# Patient Record
Sex: MALE | Race: BLACK OR AFRICAN AMERICAN | NOT HISPANIC OR LATINO | Employment: OTHER | URBAN - METROPOLITAN AREA
[De-identification: names, ages, dates, MRNs, and addresses within clinical notes are randomized per-mention and may not be internally consistent; named-entity substitution may affect disease eponyms.]

---

## 2019-08-13 PROBLEM — I10 ESSENTIAL (PRIMARY) HYPERTENSION: Status: ACTIVE | Noted: 2019-08-13

## 2019-08-19 ENCOUNTER — TELEPHONE (OUTPATIENT)
Dept: PHARMACY | Facility: CLINIC | Age: 59
End: 2019-08-19

## 2019-08-19 NOTE — TELEPHONE ENCOUNTER
No answer/VM to inform patient that [facility] received prescription for Mavyret and prior authorization is required.  [facility] will be back in touch once insurance determination is received.

## 2019-10-18 NOTE — TELEPHONE ENCOUNTER
DOCUMENTATION ONLY:  Appeal for Mavyret overturned from 9/24/2019 to 11/19/2019 x 8 weeks of treatment.   Case ID# [id number]    Co-pay: $0    Patient Assistance IS NOT required.     Forward to clinical pharmacist for consult & shipment.

## 2019-10-22 ENCOUNTER — TELEPHONE (OUTPATIENT)
Dept: PHARMACY | Facility: CLINIC | Age: 59
End: 2019-10-22

## 2019-10-22 NOTE — TELEPHONE ENCOUNTER
Initial Mavyret consult completed on 10/22. Mavyret will be shipped on 10/24 to arrive at patient's home on 10/25 via [employer]. Patient will start Mavyret on 10/26. Address confirmed. Confirmed 2 patient identifiers - name and . Therapy Appropriate.     Mavyret 100/40mg- Take three tablets by mouth once daily WITH FOOD x 8 weeks  Counseling was reviewed:   1. Patient MUST take Mavyret at the SAME time every day.   2. Potential Side effects include: nausea, headaches, diarrhea and fatigue.   Headache: Patient may treat with OTC remedies. If Tylenol is used, dose should not exceed 2000mg per day. Pt is taking Hydrocodone/APAP - Reminded the pt that this product contains 325mg of APAP. He reports that he takes no more than 3 per day. Reinforced that if he needs additional Tylenol for headache/pain that he should not exceed 3 regular strength Tylenol in addition to his Hydrocodone/APAP.   4. Medication list reviewed. No DDIs or allergies noted. Patient MUST contact myself or provider prior to starting any new OTC, herbal, or prescription drugs to avoid potential DDIs.    DDI: Medication list reviewed and potential DDIs addressed.    Discussed the importance of staying well hydrated while on therapy. Compliance stressed - patient to take missed doses as soon as remembered, but NOT to take 2 doses in one day. Patient will report questions or concerns to myself or practitioner. Patient verbalizes understanding. Patient plans to start Mavyret on 10/26.  Consultation included: indication; goals of treatment; administration; storage and handling; side effects; how to handle side effects; the importance of compliance; how to handle missed doses; the importance of laboratory monitoring; the importance of keeping all follow up appointments.  Patient understands to report any medication changes to [facility] and provider. All questions answered and addressed to patients satisfaction. F/U will occur 7-10 days from start of treatment, [facility]  to contact patient in 3 weeks for refills.

## 2019-10-30 PROBLEM — B44.9 ASPERGILLOMA: Status: ACTIVE | Noted: 2019-10-30

## 2019-11-18 ENCOUNTER — TELEPHONE (OUTPATIENT)
Dept: PHARMACY | Facility: CLINIC | Age: 59
End: 2019-11-18

## 2019-11-18 NOTE — TELEPHONE ENCOUNTER
Refill and follow up call for Mavyret (2 of 2) Patient confirmed they are in need of refill. Will ship out  to receive  with patient consent. Copay $0 in 004.    Initial clinical follow-up conducted for [place]. Name/ confirmed. no missed doses; no new medications. Patient did note that he felt like his heart was racing at times.Patient regularly checks his heart rate with his blood pressure and has not noticed any major shifts in his baseline heart rate. Advised patient to follow up with MD regarding the potential tachycardia and that it probably was not related to Mavyret therapy. Patient has an appt scheduled for Thursday and will talk to his MD then. Patient also noted that he was experiencing some constipation that has been giving him some issues probably related to [place] therapy. Advised patient to increase clear fluid intake and dietary fiber. Also recommended trying Lili-colace for more immediate relief and the patient can continue taking Lili-Colace prn for opioid induced constipation. If symptoms progress or are unresolved by pericolace to contact [facility] or [facility]. Advised on the effect of laxatives and timing of doses. Patient understands to report any medication changes to [facility] and provider. All questions answered and addressed to patients satisfaction.

## 2019-11-25 PROBLEM — M50.20 CERVICAL DISC HERNIATION: Status: ACTIVE | Noted: 2019-07-17

## 2019-11-25 PROBLEM — B18.2 HEP C W/O COMA, CHRONIC: Status: ACTIVE | Noted: 2019-11-25

## 2019-11-25 PROBLEM — F17.200 SMOKER: Status: ACTIVE | Noted: 2019-11-25

## 2019-11-25 PROBLEM — Z85.118 HISTORY OF LUNG CANCER: Status: ACTIVE | Noted: 2019-11-25

## 2019-11-25 PROBLEM — M54.16 CHRONIC RADICULAR PAIN OF LOWER BACK: Status: ACTIVE | Noted: 2017-10-18

## 2019-11-25 PROBLEM — G89.29 CHRONIC RADICULAR PAIN OF LOWER BACK: Status: ACTIVE | Noted: 2017-10-18

## 2019-11-25 PROBLEM — M54.2 NECK PAIN: Status: ACTIVE | Noted: 2019-10-31

## 2019-11-25 PROBLEM — M48.02 CERVICAL STENOSIS OF SPINAL CANAL: Status: ACTIVE | Noted: 2017-03-03

## 2020-03-31 PROBLEM — E55.9 VITAMIN D DEFICIENCY: Chronic | Status: ACTIVE | Noted: 2020-03-31

## 2020-03-31 PROBLEM — N18.30 ANEMIA OF CHRONIC RENAL FAILURE, STAGE 3 (MODERATE): Status: ACTIVE | Noted: 2020-03-31

## 2020-03-31 PROBLEM — D63.1 ANEMIA OF CHRONIC RENAL FAILURE, STAGE 3 (MODERATE): Status: ACTIVE | Noted: 2020-03-31

## 2020-04-03 PROBLEM — F11.20 OPIOID DEPENDENCE: Status: ACTIVE | Noted: 2020-04-03

## 2020-04-03 PROBLEM — M54.50 LOW BACK PAIN: Status: ACTIVE | Noted: 2020-04-03

## 2020-10-06 ENCOUNTER — PATIENT MESSAGE (OUTPATIENT)
Dept: RESEARCH | Facility: OTHER | Age: 60
End: 2020-10-06

## 2021-03-18 PROBLEM — R13.19 OTHER DYSPHAGIA: Status: ACTIVE | Noted: 2021-03-18

## 2021-04-19 PROBLEM — Z86.19 HISTORY OF HEPATITIS C: Status: ACTIVE | Noted: 2021-04-19

## 2021-04-19 PROBLEM — M10.9 GOUT: Status: ACTIVE | Noted: 2021-04-19

## 2021-04-19 PROBLEM — R13.12 OROPHARYNGEAL DYSPHAGIA: Status: ACTIVE | Noted: 2021-04-19

## 2021-04-19 PROBLEM — E46 MALNUTRITION: Status: ACTIVE | Noted: 2021-04-19

## 2021-05-25 PROBLEM — F11.20 OPIOID DEPENDENCE: Status: RESOLVED | Noted: 2020-04-03 | Resolved: 2021-05-25

## 2021-06-18 PROBLEM — Z96.89 S/P INSERTION OF SPINAL CORD STIMULATOR: Status: ACTIVE | Noted: 2021-06-18

## 2021-07-06 PROBLEM — R63.0 ANOREXIA: Status: ACTIVE | Noted: 2021-07-06

## 2021-07-08 PROBLEM — N17.9 AKI (ACUTE KIDNEY INJURY): Status: ACTIVE | Noted: 2021-07-08

## 2021-07-08 PROBLEM — R53.1 GENERALIZED WEAKNESS: Status: ACTIVE | Noted: 2021-07-08

## 2021-07-08 PROBLEM — E43 SEVERE MALNUTRITION: Status: ACTIVE | Noted: 2021-07-08

## 2021-08-15 PROBLEM — R33.9 URINARY RETENTION: Status: ACTIVE | Noted: 2021-08-15

## 2021-08-15 PROBLEM — Z93.1 PEG (PERCUTANEOUS ENDOSCOPIC GASTROSTOMY) STATUS: Status: ACTIVE | Noted: 2021-08-15

## 2021-08-15 PROBLEM — K62.89 RECTAL PAIN: Status: ACTIVE | Noted: 2021-08-15

## 2021-08-15 PROBLEM — L98.8 SKIN MACERATION: Status: ACTIVE | Noted: 2021-08-15

## 2021-08-15 PROBLEM — K59.09 CHRONIC CONSTIPATION: Status: ACTIVE | Noted: 2021-08-15

## 2021-08-16 PROBLEM — K62.89 RECTAL PAIN: Status: RESOLVED | Noted: 2021-08-15 | Resolved: 2021-08-16

## 2021-08-16 PROBLEM — E87.1 HYPONATREMIA: Status: ACTIVE | Noted: 2021-08-16

## 2021-08-16 PROBLEM — R16.0 LIVER MASS: Status: ACTIVE | Noted: 2021-08-16

## 2021-08-16 PROBLEM — R10.9 ABDOMINAL PAIN: Status: ACTIVE | Noted: 2021-08-15

## 2021-08-18 PROBLEM — N18.4 CKD (CHRONIC KIDNEY DISEASE), STAGE IV: Status: ACTIVE | Noted: 2021-08-18

## 2021-08-19 PROBLEM — E87.1 HYPONATREMIA: Status: RESOLVED | Noted: 2021-08-16 | Resolved: 2021-08-19

## 2021-08-19 PROBLEM — R10.9 ABDOMINAL PAIN: Status: RESOLVED | Noted: 2021-08-15 | Resolved: 2021-08-19

## 2021-08-19 PROBLEM — R33.9 URINARY RETENTION: Status: RESOLVED | Noted: 2021-08-15 | Resolved: 2021-08-19

## 2022-07-03 PROBLEM — N25.81 SECONDARY HYPERPARATHYROIDISM OF RENAL ORIGIN: Status: ACTIVE | Noted: 2022-07-03

## 2022-07-03 PROBLEM — E87.20 METABOLIC ACIDOSIS: Status: ACTIVE | Noted: 2022-07-03

## 2022-07-03 PROBLEM — E83.52 HYPERCALCEMIA: Status: ACTIVE | Noted: 2022-07-03

## 2022-08-08 PROBLEM — R10.84 GENERALIZED ABDOMINAL PAIN: Status: ACTIVE | Noted: 2021-08-15

## 2022-08-09 PROBLEM — E87.5 HYPERKALEMIA: Status: ACTIVE | Noted: 2022-08-09

## 2022-11-14 PROBLEM — G89.3 CANCER RELATED PAIN: Status: ACTIVE | Noted: 2022-11-14

## 2022-11-14 PROBLEM — R00.2 PALPITATIONS: Status: ACTIVE | Noted: 2022-11-14

## 2023-01-26 DIAGNOSIS — U07.1 COVID-19 VIRUS DETECTED: ICD-10-CM

## 2023-01-27 ENCOUNTER — NURSE TRIAGE (OUTPATIENT)
Dept: ADMINISTRATIVE | Facility: CLINIC | Age: 63
End: 2023-01-27

## 2023-01-27 NOTE — TELEPHONE ENCOUNTER
Pt states tested positive for Covid yesterday, was told a rx would be called into pharmacy. Pt states no rx at this time, and he called earlier this morning and has not heard back from office. Per protocol, discuss with PCP and callback by nurse within 1 hour.     Reason for Disposition   Prescription not at pharmacy and was prescribed by PCP recently  (Exception: triager has access to EMR and prescription is recorded there. Go to [facility] and confirm for pharmacy.)    Protocols used: Medication Refill and Renewal Call-A-OH

## 2023-01-27 NOTE — TELEPHONE ENCOUNTER
Spoke with patient states he spoke with two other nurses earlier.  Patient was triaged again as he states his symptoms are worsening.  Current symptoms are mild difficulty breathing, chest pressure, elevated heart rate, cough, and weakness.  Current B/P 97-53, HR-114.  Patient denies having severe difficulty breathing.  Advised patient to go to ER for evaluation.  Patient verbalized understanding.    Patient states he does not have transportation.  Informed patient that he could call [id number] for transport.    Reason for Disposition   Chest pain or pressure  (Exception: MILD central chest pain, present only when coughing.)   Dizziness, lightheadedness, or weakness    Additional Information   Negative: SEVERE difficulty breathing (e.g., struggling for each breath, speaks in single words)   Negative: Difficult to awaken or acting confused (e.g., disoriented, slurred speech)   Negative: Bluish (or gray) lips or face now   Negative: Shock suspected (e.g., cold/pale/clammy skin, too weak to stand, low BP, rapid pulse)   Negative: Sounds like a life-threatening emergency to the triager   Negative: SEVERE or constant chest pain or pressure  (Exception: Mild central chest pain, present only when coughing.)   Negative: MODERATE difficulty breathing (e.g., speaks in phrases, SOB even at rest, pulse 100-120)   Negative: Headache and stiff neck (can't touch chin to chest)   Negative: Oxygen level (e.g., pulse oximetry) 90 percent or lower   Negative: Passed out (i.e., lost consciousness, collapsed and was not responding)   Negative: Shock suspected (e.g., cold/pale/clammy skin, too weak to stand, low BP, rapid pulse)   Negative: Difficult to awaken or acting confused (e.g., disoriented, slurred speech)   Negative: Visible sweat on face or sweat dripping down face   Negative: Unable to walk, or can only walk with assistance (e.g., requires support)   Negative: Received SHOCK from implantable cardiac defibrillator and has  persisting symptoms (i.e., palpitations, lightheadedness)   Negative: Dizziness, lightheadedness, or weakness and heart beating very rapidly (e.g., > 140 / minute)   Negative: Dizziness, lightheadedness, or weakness and heart beating very slowly (e.g., < 50 / minute)   Negative: Sounds like a life-threatening emergency to the triager   Negative: Difficulty breathing    Protocols used: Coronavirus (COVID-19) Diagnosed or [id number], Heart Rate and Heartbeat [id number]

## 2023-01-27 NOTE — TELEPHONE ENCOUNTER
Pt called for c/o medication not at the Pharmacy and was seen yesterday by the oncologist and he said they were suppose to send in medication for covid. Pt triaged for covid and care advice was to discuss with PCP and get a call back from a nurse within an hour. Pt told to reach back out if SOB gets worse. Pt has CP but only when coughing. He said that he has a lot of congestion as well. Pt told that I will reach out to MD and if doesn't hear anything within the hour to call us back. Please reach out to pt ASAP              Reason for Disposition   MILD difficulty breathing (e.g., minimal/no SOB at rest, SOB with walking, pulse <100)    Additional Information   Negative: SEVERE difficulty breathing (e.g., struggling for each breath, speaks in single words)   Negative: Difficult to awaken or acting confused (e.g., disoriented, slurred speech)   Negative: Bluish (or gray) lips or face now   Negative: Shock suspected (e.g., cold/pale/clammy skin, too weak to stand, low BP, rapid pulse)   Negative: Sounds like a life-threatening emergency to the triager   Negative: SEVERE or constant chest pain or pressure  (Exception: Mild central chest pain, present only when coughing.)   Negative: MODERATE difficulty breathing (e.g., speaks in phrases, SOB even at rest, pulse 100-120)   Negative: Headache and stiff neck (can't touch chin to chest)   Negative: Oxygen level (e.g., pulse oximetry) 90 percent or lower   Negative: Chest pain or pressure  (Exception: MILD central chest pain, present only when coughing.)   Negative: Patient sounds very sick or weak to the triager    Protocols used: Coronavirus (COVID-19) Diagnosed or [id number]

## 2023-01-30 ENCOUNTER — NURSE TRIAGE (OUTPATIENT)
Dept: ADMINISTRATIVE | Facility: CLINIC | Age: 63
End: 2023-01-30

## 2023-01-30 NOTE — TELEPHONE ENCOUNTER
Spoke with pt who states he tested positive for COVID. States that he has productive cough. Asking if ok to take robitussin with augmentin.  Pt advised should have call by office within 1 hour.pt verbalized understanding    Reason for Disposition   [1] HIGH RISK for severe COVID complications (e.g., weak immune system, age > 64 years, obesity with BMI of 30 or higher, pregnant, chronic lung disease or other chronic medical condition) AND [2] COVID symptoms (e.g., cough, fever)  (Exceptions: Already seen by PCP and no new or worsening symptoms.)    Additional Information   Negative: SEVERE difficulty breathing (e.g., struggling for each breath, speaks in single words)   Negative: Difficult to awaken or acting confused (e.g., disoriented, slurred speech)   Negative: Bluish (or gray) lips or face now   Negative: Shock suspected (e.g., cold/pale/clammy skin, too weak to stand, low BP, rapid pulse)   Negative: Sounds like a life-threatening emergency to the triager   Negative: SEVERE or constant chest pain or pressure  (Exception: Mild central chest pain, present only when coughing.)   Negative: MODERATE difficulty breathing (e.g., speaks in phrases, SOB even at rest, pulse 100-120)   Negative: Headache and stiff neck (can't touch chin to chest)   Negative: Oxygen level (e.g., pulse oximetry) 90 percent or lower   Negative: Chest pain or pressure  (Exception: MILD central chest pain, present only when coughing.)   Negative: Patient sounds very sick or weak to the triager   Negative: MILD difficulty breathing (e.g., minimal/no SOB at rest, SOB with walking, pulse <100)   Negative: Fever > 103 F (39.4 C)   Negative: [1] Fever > 101 F (38.3 C) AND [2] over 60 years of age   Negative: [1] Fever > 100.0 F (37.8 C) AND [2] bedridden (e.g., CVA, chronic illness, recovering from surgery)    Protocols used: Coronavirus (COVID-19) Diagnosed or [id number]

## 2023-02-04 ENCOUNTER — NURSE TRIAGE (OUTPATIENT)
Dept: ADMINISTRATIVE | Facility: CLINIC | Age: 63
End: 2023-02-04

## 2023-02-04 NOTE — TELEPHONE ENCOUNTER
[facility] pt.   CV+ 1/26 in oncology clinic.     Pt states symptoms have resolved, finished abx & feeling much better. Wanted to call to note chart. No further assistance needed.     Reason for Disposition   General information question, no triage required and triager able to answer question    Protocols used: Information Only Call - No Triage-A-

## 2023-02-24 PROBLEM — D84.9 IMMUNOCOMPROMISED STATE: Status: ACTIVE | Noted: 2023-02-24

## 2023-02-24 PROBLEM — I26.99 PULMONARY INFARCTION: Status: ACTIVE | Noted: 2023-02-24

## 2023-02-24 PROBLEM — J18.9 PNEUMONIA OF RIGHT UPPER LOBE DUE TO INFECTIOUS ORGANISM: Status: ACTIVE | Noted: 2023-02-24

## 2023-03-01 PROBLEM — M10.9 GOUT: Status: RESOLVED | Noted: 2021-04-19 | Resolved: 2023-03-01

## 2023-03-04 PROBLEM — N18.9 ACUTE-ON-CHRONIC KIDNEY INJURY: Status: ACTIVE | Noted: 2021-07-08

## 2023-03-06 PROBLEM — Z71.6 TOBACCO ABUSE COUNSELING: Status: ACTIVE | Noted: 2023-03-06

## 2023-04-20 PROBLEM — R11.0 NAUSEA: Status: ACTIVE | Noted: 2023-04-20

## 2023-04-20 PROBLEM — J18.9 PNEUMONIA: Status: ACTIVE | Noted: 2023-04-20

## 2023-04-20 PROBLEM — R11.10 VOMITING: Status: ACTIVE | Noted: 2023-04-20

## 2023-04-20 PROBLEM — R07.82 INTERCOSTAL PAIN: Status: ACTIVE | Noted: 2023-04-20

## 2023-04-20 PROBLEM — Z09 S/P GASTROSTOMY TUBE (G TUBE) PLACEMENT, FOLLOW-UP EXAM: Status: ACTIVE | Noted: 2023-04-20

## 2023-04-20 PROBLEM — K59.00 CONSTIPATION: Status: ACTIVE | Noted: 2021-08-15

## 2023-04-22 PROBLEM — R00.0 TACHYCARDIA: Status: ACTIVE | Noted: 2023-04-22

## 2023-04-24 PROBLEM — K62.5 BRBPR (BRIGHT RED BLOOD PER RECTUM): Status: ACTIVE | Noted: 2023-04-24

## 2023-06-05 PROBLEM — N17.9 ACUTE-ON-CHRONIC KIDNEY INJURY: Status: RESOLVED | Noted: 2021-07-08 | Resolved: 2023-06-05

## 2023-06-05 PROBLEM — N18.9 ACUTE-ON-CHRONIC KIDNEY INJURY: Status: RESOLVED | Noted: 2021-07-08 | Resolved: 2023-06-05

## 2023-07-15 PROBLEM — N18.6 ESRD (END STAGE RENAL DISEASE): Status: ACTIVE | Noted: 2023-07-15

## 2023-07-24 PROBLEM — J18.9 PNEUMONIA OF LEFT LOWER LOBE DUE TO INFECTIOUS ORGANISM: Status: RESOLVED | Noted: 2023-02-24 | Resolved: 2023-07-24

## 2023-07-24 PROBLEM — J18.9 COMMUNITY ACQUIRED PNEUMONIA: Status: RESOLVED | Noted: 2023-04-20 | Resolved: 2023-07-24

## 2023-07-24 PROBLEM — Z09 S/P GASTROSTOMY TUBE (G TUBE) PLACEMENT, FOLLOW-UP EXAM: Status: RESOLVED | Noted: 2023-04-20 | Resolved: 2023-07-24

## 2023-07-24 PROBLEM — K62.5 BRBPR (BRIGHT RED BLOOD PER RECTUM): Status: RESOLVED | Noted: 2023-04-24 | Resolved: 2023-07-24

## 2023-07-24 PROBLEM — B44.9 ASPERGILLOSIS, WITH PNEUMONIA: Status: RESOLVED | Noted: 2019-10-30 | Resolved: 2023-07-24

## 2023-08-23 ENCOUNTER — PATIENT OUTREACH (OUTPATIENT)
Dept: ADMINISTRATIVE | Facility: HOSPITAL | Age: 63
End: 2023-08-23

## 2023-09-21 PROBLEM — B44.1 PULMONARY ASPERGILLOSIS: Status: ACTIVE | Noted: 2019-10-30

## 2023-09-21 PROBLEM — F17.200 TOBACCO DEPENDENCY: Status: ACTIVE | Noted: 2023-09-21

## 2023-09-22 PROBLEM — Z91.148 POOR COMPLIANCE WITH MEDICATION: Status: ACTIVE | Noted: 2023-09-22

## 2023-09-22 PROBLEM — J96.11 CHRONIC RESPIRATORY FAILURE WITH HYPOXIA: Status: ACTIVE | Noted: 2023-09-22

## 2023-09-22 PROBLEM — J43.9 LUNG DISEASE, BULLOUS: Status: ACTIVE | Noted: 2023-09-22

## 2023-09-22 PROBLEM — R53.81 PHYSICAL DECONDITIONING: Status: ACTIVE | Noted: 2023-09-22

## 2023-09-22 PROBLEM — Z71.89 COUNSELING REGARDING ADVANCE DIRECTIVES AND GOALS OF CARE: Status: ACTIVE | Noted: 2023-09-22

## 2023-09-23 PROBLEM — N25.81 SECONDARY HYPERPARATHYROIDISM: Status: ACTIVE | Noted: 2023-09-23

## 2023-09-24 PROBLEM — J15.1 PSEUDOMONAS PNEUMONIA: Status: ACTIVE | Noted: 2023-09-24

## 2023-09-28 PROBLEM — Z93.1 FEEDING BY G-TUBE: Status: ACTIVE | Noted: 2023-09-28

## 2023-09-30 PROBLEM — D63.1 ANEMIA IN ESRD (END-STAGE RENAL DISEASE): Status: ACTIVE | Noted: 2023-09-30

## 2023-09-30 PROBLEM — K59.04 CHRONIC IDIOPATHIC CONSTIPATION: Status: ACTIVE | Noted: 2021-08-15

## 2023-09-30 PROBLEM — N18.6 ESRD (END STAGE RENAL DISEASE) ON DIALYSIS: Status: ACTIVE | Noted: 2023-09-30

## 2023-09-30 PROBLEM — Z99.2 ESRD (END STAGE RENAL DISEASE) ON DIALYSIS: Status: ACTIVE | Noted: 2023-09-30

## 2023-09-30 PROBLEM — N18.6 ANEMIA IN ESRD (END-STAGE RENAL DISEASE): Status: ACTIVE | Noted: 2023-09-30

## 2023-10-03 PROBLEM — Z91.89 AT RISK FOR PROLONGED QT INTERVAL SYNDROME: Status: ACTIVE | Noted: 2023-10-03

## 2023-11-29 PROBLEM — J96.11 CHRONIC RESPIRATORY FAILURE WITH HYPOXIA: Status: RESOLVED | Noted: 2023-09-22 | Resolved: 2023-11-29

## 2023-12-25 PROBLEM — J15.1 PSEUDOMONAS PNEUMONIA: Status: RESOLVED | Noted: 2023-09-24 | Resolved: 2023-12-25

## 2023-12-25 PROBLEM — J18.9 PNEUMONIA OF RIGHT UPPER LOBE DUE TO INFECTIOUS ORGANISM: Status: RESOLVED | Noted: 2023-02-24 | Resolved: 2023-12-25

## 2024-03-13 PROBLEM — N18.4 CKD (CHRONIC KIDNEY DISEASE), STAGE IV: Status: RESOLVED | Noted: 2021-08-18 | Resolved: 2024-03-13

## 2024-03-13 PROBLEM — N18.6 ESRD (END STAGE RENAL DISEASE): Status: RESOLVED | Noted: 2023-07-15 | Resolved: 2024-03-13

## 2024-04-08 PROBLEM — Z99.2 ANEMIA IN CHRONIC KIDNEY DISEASE, ON CHRONIC DIALYSIS: Status: ACTIVE | Noted: 2023-09-30

## 2024-04-09 PROBLEM — I25.10 CORONARY ARTERY DISEASE: Status: ACTIVE | Noted: 2024-04-09

## 2024-04-09 PROBLEM — I21.4 NSTEMI (NON-ST ELEVATED MYOCARDIAL INFARCTION): Status: ACTIVE | Noted: 2024-04-09

## 2024-05-03 PROBLEM — I16.1 HYPERTENSIVE EMERGENCY: Status: ACTIVE | Noted: 2024-05-03

## 2024-05-03 PROBLEM — J96.22 ACUTE ON CHRONIC RESPIRATORY FAILURE WITH HYPOXIA AND HYPERCAPNIA: Status: ACTIVE | Noted: 2024-05-03

## 2024-05-03 PROBLEM — J96.21 ACUTE ON CHRONIC RESPIRATORY FAILURE WITH HYPOXIA AND HYPERCAPNIA: Status: ACTIVE | Noted: 2024-05-03

## 2024-05-04 PROBLEM — J43.1 PANLOBULAR EMPHYSEMA: Status: ACTIVE | Noted: 2024-05-04

## 2024-05-06 PROBLEM — I10 ESSENTIAL HYPERTENSION: Status: ACTIVE | Noted: 2024-05-06

## 2024-05-06 PROBLEM — R06.02 SOB (SHORTNESS OF BREATH): Status: ACTIVE | Noted: 2024-05-06

## 2024-05-06 PROBLEM — E83.39 HYPERPHOSPHATEMIA: Status: ACTIVE | Noted: 2024-05-06

## 2024-05-30 ENCOUNTER — CLINICAL SUPPORT (OUTPATIENT)
Dept: SMOKING CESSATION | Facility: CLINIC | Age: 64
End: 2024-05-30
Payer: COMMERCIAL

## 2024-05-30 DIAGNOSIS — F17.200 NICOTINE DEPENDENCE: Primary | ICD-10-CM

## 2024-05-30 PROCEDURE — 99404 PREV MED CNSL INDIV APPRX 60: CPT | Mod: S$GLB,,, | Performed by: GENERAL PRACTICE

## 2024-05-30 RX ORDER — MICONAZOLE NITRATE 2 %
2 CREAM (GRAM) TOPICAL
Qty: 100 EACH | Refills: 0 | Status: SHIPPED | OUTPATIENT
Start: 2024-05-30

## 2024-05-30 RX ORDER — IBUPROFEN 200 MG
1 TABLET ORAL DAILY
Qty: 14 PATCH | Refills: 0 | Status: SHIPPED | OUTPATIENT
Start: 2024-05-30

## 2024-06-12 ENCOUNTER — CLINICAL SUPPORT (OUTPATIENT)
Dept: SMOKING CESSATION | Facility: CLINIC | Age: 64
End: 2024-06-12
Payer: COMMERCIAL

## 2024-06-12 DIAGNOSIS — F17.200 NICOTINE DEPENDENCE: Primary | ICD-10-CM

## 2024-06-12 PROCEDURE — 99402 PREV MED CNSL INDIV APPRX 30: CPT | Mod: S$GLB,,, | Performed by: GENERAL PRACTICE

## 2024-06-12 NOTE — PROGRESS NOTES
Individual Follow-Up Form    6/12/2024    Quit Date: 5/30/24    Clinical Status of Patient: Outpatient    Length of Service: 30 minutes    Continuing Medication: yes  Nicotine gum    Other Medications: none     Target Symptoms: Withdrawal and medication side effects. The following were  rated moderate (3) to severe (4) on TCRS:  Moderate (3): none  Severe (4): none    Comments: Followed up with patient in clinic. Patient has quit smoking. Orientation, client introductions, completion of TCRS (Tobacco Cessation Rating Scale) learned addiction model, cues/triggers, personal reasons for quitting, medications, goals, quit date. The patient will continue with  therapy sessions and medication monitoring by CTTS. Prescribed medication management will be by physician. The patient denies any abnormal behavioral or mental changes at this time.      Diagnosis: F17.210    Next Visit: 2 weeks

## 2024-06-27 ENCOUNTER — CLINICAL SUPPORT (OUTPATIENT)
Dept: SMOKING CESSATION | Facility: CLINIC | Age: 64
End: 2024-06-27
Attending: SURGERY
Payer: COMMERCIAL

## 2024-06-27 DIAGNOSIS — F17.200 NICOTINE DEPENDENCE: Primary | ICD-10-CM

## 2024-06-27 RX ORDER — IBUPROFEN 200 MG
1 TABLET ORAL DAILY
Qty: 14 PATCH | Refills: 0 | Status: SHIPPED | OUTPATIENT
Start: 2024-06-27

## 2024-06-27 RX ORDER — MICONAZOLE NITRATE 2 %
2 CREAM (GRAM) TOPICAL
Qty: 100 EACH | Refills: 0 | Status: SHIPPED | OUTPATIENT
Start: 2024-06-27

## 2024-06-27 NOTE — PROGRESS NOTES
Individual Follow-Up Form    6/27/2024    Quit Date: TBD    Clinical Status of Patient: Outpatient    Length of Service: 30 minutes    Continuing Medication: yes  Patches    Other Medications: gum     Target Symptoms: Withdrawal and medication side effects. The following were  rated moderate (3) to severe (4) on TCRS:  Moderate (3): none  Severe (4): none    Comments: Followed up with patient in clinic. Patient did start back smoking due to missing the taste of a cigarette. Completion of TCRS (Tobacco Cessation Rating Scale) reviewed strategies, cues, and triggers. Introduced the negative impact of tobacco on health, the health advantages of discontinuing the use of tobacco, time line improved health changes after a quit, withdrawal issues to expect from nicotine and habit, and ways to achieve the goal of a quit. The patient will continue with  therapy sessions and medication monitoring by CTTS. Prescribed medication management will be by physician. The patient denies any abnormal behavioral or mental changes at this time.      Diagnosis: F17.210    Next Visit: 2 weeks

## 2024-07-10 ENCOUNTER — CLINICAL SUPPORT (OUTPATIENT)
Dept: SMOKING CESSATION | Facility: CLINIC | Age: 64
End: 2024-07-10
Payer: COMMERCIAL

## 2024-07-10 DIAGNOSIS — F17.200 NICOTINE DEPENDENCE: Primary | ICD-10-CM

## 2024-07-10 NOTE — PROGRESS NOTES
Individual Follow-Up Form    7/10/2024    Quit Date: TBD    Clinical Status of Patient: Outpatient    Length of Service: 30 minutes    Continuing Medication: yes  Patches    Other Medications: lozenges     Target Symptoms: Withdrawal and medication side effects. The following were  rated moderate (3) to severe (4) on TCRS:  Moderate (3): none  Severe (4): none    Comments: Followed up with patient in clinic. Patient did go up to 4 cigarettes per day due to his cousin passing away.  Encouraged patient to start thinking of a quit date. The patient will continue with  therapy sessions and medication monitoring by CTTS. Prescribed medication management will be by physician. The patient denies any abnormal behavioral or mental changes at this time.      Diagnosis: F17.210    Next Visit: 2 weeks

## 2024-07-15 PROBLEM — I21.4 NSTEMI (NON-ST ELEVATED MYOCARDIAL INFARCTION): Status: RESOLVED | Noted: 2024-04-09 | Resolved: 2024-07-15

## 2024-07-25 ENCOUNTER — CLINICAL SUPPORT (OUTPATIENT)
Dept: SMOKING CESSATION | Facility: CLINIC | Age: 64
End: 2024-07-25
Payer: COMMERCIAL

## 2024-07-25 DIAGNOSIS — F17.200 NICOTINE DEPENDENCE: Primary | ICD-10-CM

## 2024-07-25 PROCEDURE — 99402 PREV MED CNSL INDIV APPRX 30: CPT | Mod: S$GLB,,, | Performed by: GENERAL PRACTICE

## 2024-07-29 NOTE — PROGRESS NOTES
Individual Follow-Up Form    7/29/2024    Quit Date: TBD    Clinical Status of Patient: Outpatient    Length of Service: 30 minutes    Continuing Medication: yes  Patches    Other Medications: lozenges     Target Symptoms: Withdrawal and medication side effects. The following were  rated moderate (3) to severe (4) on TCRS:  Moderate (3): none  Severe (4): none    Comments: Followed up with patient. He is smoking 2 cigarettes per day. Completion of TCRS (Tobacco Cessation Rating Scale) reviewed strategies, habitual behavior, stress, and high risk situations. Introduced stress with addition interventions, SOLVE, relaxation with interventions, nutrition, exercise, weight gain, and the importance of rewarding oneself for accomplishments toward becoming tobacco free. Open discussion of all items with interventions.  The patient will continue with  therapy sessions and medication monitoring by CTTS. Prescribed medication management will be by physician. The patient denies any abnormal behavioral or mental changes at this time.      Diagnosis: F17.210    Next Visit: 2 weeks

## 2024-08-05 PROBLEM — J96.21 ACUTE ON CHRONIC RESPIRATORY FAILURE WITH HYPOXIA AND HYPERCAPNIA: Status: RESOLVED | Noted: 2024-05-03 | Resolved: 2024-08-05

## 2024-08-05 PROBLEM — J96.22 ACUTE ON CHRONIC RESPIRATORY FAILURE WITH HYPOXIA AND HYPERCAPNIA: Status: RESOLVED | Noted: 2024-05-03 | Resolved: 2024-08-05

## 2024-08-07 ENCOUNTER — CLINICAL SUPPORT (OUTPATIENT)
Dept: SMOKING CESSATION | Facility: CLINIC | Age: 64
End: 2024-08-07
Payer: COMMERCIAL

## 2024-08-07 DIAGNOSIS — F17.200 NICOTINE DEPENDENCE: Primary | ICD-10-CM

## 2024-08-07 PROBLEM — Z93.1 PEG (PERCUTANEOUS ENDOSCOPIC GASTROSTOMY) STATUS: Status: RESOLVED | Noted: 2021-08-15 | Resolved: 2024-08-07

## 2024-08-07 PROBLEM — Z74.2 NEED FOR HOME HEALTH CARE: Status: ACTIVE | Noted: 2024-08-07

## 2024-08-22 ENCOUNTER — CLINICAL SUPPORT (OUTPATIENT)
Dept: SMOKING CESSATION | Facility: CLINIC | Age: 64
End: 2024-08-22
Payer: COMMERCIAL

## 2024-08-22 DIAGNOSIS — F17.200 NICOTINE DEPENDENCE: Primary | ICD-10-CM

## 2024-08-22 PROCEDURE — 99402 PREV MED CNSL INDIV APPRX 30: CPT | Mod: S$GLB,,, | Performed by: GENERAL PRACTICE

## 2024-08-26 NOTE — PROGRESS NOTES
Individual Follow-Up Form    8/26/2024    Quit Date: TBD    Clinical Status of Patient: Outpatient    Length of Service: 30 minutes    Continuing Medication: yes  Patches    Other Medications: none     Target Symptoms: Withdrawal and medication side effects. The following were  rated moderate (3) to severe (4) on TCRS:  Moderate (3): none  Severe (4): none    Comments: Followed up with patient. He is down to 1 cigarette per day. completion of TCRS (Tobacco Cessation Rating Scale) reviewed strategies, cues, triggers, high risk situations, lapses, relapses, diet, exercise, stress, relaxation, sleep, habitual behavior, and life style changes. The patient will continue with  therapy sessions and medication monitoring by CTTS. Prescribed medication management will be by physician. The patient denies any abnormal behavioral or mental changes at this time.      Diagnosis: F17.210    Next Visit: 2 weeks

## 2024-09-02 PROBLEM — J44.1 COPD WITH ACUTE EXACERBATION: Status: ACTIVE | Noted: 2024-09-02

## 2024-09-04 PROBLEM — E55.9 VITAMIN D DEFICIENCY, UNSPECIFIED: Status: ACTIVE | Noted: 2024-09-04

## 2024-09-06 PROBLEM — J96.21 ACUTE ON CHRONIC HYPOXIC RESPIRATORY FAILURE: Status: RESOLVED | Noted: 2024-05-03 | Resolved: 2024-09-06

## 2024-09-06 PROBLEM — J44.1 COPD WITH ACUTE EXACERBATION: Status: RESOLVED | Noted: 2024-09-02 | Resolved: 2024-09-06

## 2024-09-09 ENCOUNTER — TELEPHONE (OUTPATIENT)
Dept: SMOKING CESSATION | Facility: CLINIC | Age: 64
End: 2024-09-09
Payer: MEDICAID

## 2024-11-22 PROBLEM — J96.01 ACUTE HYPOXEMIC RESPIRATORY FAILURE: Status: ACTIVE | Noted: 2024-11-22

## 2024-11-23 PROBLEM — E87.5 HYPERKALEMIA: Status: RESOLVED | Noted: 2022-08-09 | Resolved: 2024-11-23

## 2024-11-26 ENCOUNTER — TELEPHONE (OUTPATIENT)
Dept: SMOKING CESSATION | Facility: CLINIC | Age: 64
End: 2024-11-26
Payer: MEDICAID

## 2024-11-26 NOTE — TELEPHONE ENCOUNTER
1st attempt regarding smoking cessation 6 month follow up for quit 1 episode. Voicemail box is full. Unable to leave a message.

## 2025-01-02 PROBLEM — E83.59 OTHER DISORDERS OF CALCIUM METABOLISM: Status: ACTIVE | Noted: 2023-08-09

## 2025-01-02 PROBLEM — R53.81 OTHER MALAISE: Status: ACTIVE | Noted: 2023-10-03

## 2025-01-02 PROBLEM — N18.4 CHRONIC KIDNEY DISEASE, STAGE 4 (SEVERE): Status: ACTIVE | Noted: 2023-10-03

## 2025-01-02 PROBLEM — N18.6 END STAGE RENAL DISEASE: Status: ACTIVE | Noted: 2023-10-03

## 2025-01-02 PROBLEM — I25.10 ATHEROSCLEROTIC HEART DISEASE OF NATIVE CORONARY ARTERY WITHOUT ANGINA PECTORIS: Status: ACTIVE | Noted: 2024-04-18

## 2025-01-02 PROBLEM — M62.81 MUSCLE WEAKNESS (GENERALIZED): Status: ACTIVE | Noted: 2023-10-03

## 2025-01-02 PROBLEM — D50.9 IRON DEFICIENCY ANEMIA, UNSPECIFIED: Status: ACTIVE | Noted: 2024-05-13

## 2025-01-02 PROBLEM — Z86.19 PERSONAL HISTORY OF OTHER INFECTIOUS AND PARASITIC DISEASES: Status: ACTIVE | Noted: 2023-10-03

## 2025-01-02 PROBLEM — F17.200 NICOTINE DEPENDENCE, UNSPECIFIED, UNCOMPLICATED: Status: ACTIVE | Noted: 2023-10-09

## 2025-01-02 PROBLEM — Z99.81 OXYGEN DEPENDENT: Status: ACTIVE | Noted: 2023-10-03

## 2025-01-02 PROBLEM — R63.0 LOSS OF APPETITE: Status: ACTIVE | Noted: 2023-10-03

## 2025-01-02 PROBLEM — T78.2XXA ANAPHYLACTIC SHOCK, UNSPECIFIED, INITIAL ENCOUNTER: Status: ACTIVE | Noted: 2023-08-09

## 2025-01-02 PROBLEM — J15.1 PNEUMONIA DUE TO PSEUDOMONAS: Status: ACTIVE | Noted: 2023-09-21

## 2025-01-02 PROBLEM — R10.9 UNSPECIFIED ABDOMINAL PAIN: Status: ACTIVE | Noted: 2024-06-21

## 2025-01-02 PROBLEM — B44.1: Status: ACTIVE | Noted: 2023-09-21

## 2025-01-02 PROBLEM — R11.11 VOMITING WITHOUT NAUSEA: Status: ACTIVE | Noted: 2023-08-25

## 2025-01-02 PROBLEM — Z01.84 ENCOUNTER FOR ANTIBODY RESPONSE EXAMINATION: Status: ACTIVE | Noted: 2023-08-09

## 2025-01-02 PROBLEM — B44.9 ASPERGILLOSIS, UNSPECIFIED: Status: ACTIVE | Noted: 2023-10-08

## 2025-01-02 PROBLEM — R26.9 UNSPECIFIED ABNORMALITIES OF GAIT AND MOBILITY: Status: ACTIVE | Noted: 2023-10-03

## 2025-01-02 PROBLEM — C22.0 LIVER CELL CARCINOMA: Status: ACTIVE | Noted: 2023-10-03

## 2025-01-02 PROBLEM — Z11.1 ENCOUNTER FOR SCREENING FOR RESPIRATORY TUBERCULOSIS: Status: ACTIVE | Noted: 2023-08-09

## 2025-01-02 PROBLEM — R11.2 NAUSEA WITH VOMITING, UNSPECIFIED: Status: ACTIVE | Noted: 2023-08-25

## 2025-01-02 PROBLEM — G89.29 OTHER CHRONIC PAIN: Status: ACTIVE | Noted: 2024-05-20

## 2025-01-02 PROBLEM — E88.09 OTHER DISORDERS OF PLASMA-PROTEIN METABOLISM, NOT ELSEWHERE CLASSIFIED: Status: ACTIVE | Noted: 2023-10-03

## 2025-01-02 PROBLEM — Z91.148 PATIENT'S OTHER NONCOMPLIANCE WITH MEDICATION REGIMEN FOR OTHER REASON: Status: ACTIVE | Noted: 2023-10-03

## 2025-01-02 PROBLEM — E43 UNSPECIFIED SEVERE PROTEIN-CALORIE MALNUTRITION: Status: ACTIVE | Noted: 2023-10-03

## 2025-01-02 PROBLEM — I21.4 NON-ST ELEVATION (NSTEMI) MYOCARDIAL INFARCTION: Status: ACTIVE | Noted: 2024-04-18

## 2025-01-02 PROBLEM — R07.9 CHEST PAIN, UNSPECIFIED: Status: ACTIVE | Noted: 2024-11-27

## 2025-01-02 PROBLEM — Z93.1 GASTROSTOMY STATUS: Status: ACTIVE | Noted: 2023-10-03

## 2025-01-02 PROBLEM — D63.1 ANEMIA IN CHRONIC KIDNEY DISEASE: Status: ACTIVE | Noted: 2023-08-09

## 2025-01-02 PROBLEM — J44.9 CHRONIC OBSTRUCTIVE PULMONARY DISEASE, UNSPECIFIED: Status: ACTIVE | Noted: 2023-10-03

## 2025-01-02 PROBLEM — N17.8 OTHER ACUTE KIDNEY FAILURE: Status: ACTIVE | Noted: 2023-08-09

## 2025-01-02 PROBLEM — E56.8 DEFICIENCY OF OTHER VITAMINS: Status: ACTIVE | Noted: 2023-08-09

## 2025-01-02 PROBLEM — G89.3 NEOPLASM RELATED PAIN (ACUTE) (CHRONIC): Status: ACTIVE | Noted: 2023-10-03

## 2025-01-02 PROBLEM — R27.8 OTHER LACK OF COORDINATION: Status: ACTIVE | Noted: 2023-10-03

## 2025-01-02 PROBLEM — E44.0 MODERATE PROTEIN-CALORIE MALNUTRITION: Status: ACTIVE | Noted: 2023-08-25

## 2025-01-02 PROBLEM — I10 ESSENTIAL (PRIMARY) HYPERTENSION: Status: ACTIVE | Noted: 2023-10-03

## 2025-01-02 PROBLEM — J85.2 ABSCESS OF LUNG WITHOUT PNEUMONIA: Status: ACTIVE | Noted: 2023-10-08

## 2025-01-02 PROBLEM — E55.9 VITAMIN D DEFICIENCY, UNSPECIFIED: Status: ACTIVE | Noted: 2023-10-04

## 2025-01-02 PROBLEM — Z23 ENCOUNTER FOR IMMUNIZATION: Status: ACTIVE | Noted: 2023-08-09

## 2025-01-02 PROBLEM — N18.9 ANEMIA IN CHRONIC KIDNEY DISEASE: Status: ACTIVE | Noted: 2023-08-09

## 2025-01-02 PROBLEM — D68.9 COAGULATION DEFECT, UNSPECIFIED: Status: ACTIVE | Noted: 2023-08-09

## 2025-01-02 PROBLEM — Z99.2 DEPENDENCE ON RENAL DIALYSIS: Status: ACTIVE | Noted: 2023-10-03

## 2025-01-02 PROBLEM — Z96.89 PRESENCE OF OTHER SPECIFIED FUNCTIONAL IMPLANTS: Status: ACTIVE | Noted: 2023-10-03

## 2025-01-02 PROBLEM — T78.40XA ALLERGY, UNSPECIFIED, INITIAL ENCOUNTER: Status: ACTIVE | Noted: 2023-08-09

## 2025-01-02 PROBLEM — R06.02 SHORTNESS OF BREATH: Status: ACTIVE | Noted: 2024-05-03

## 2025-01-02 PROBLEM — J43.9 EMPHYSEMA, UNSPECIFIED: Status: ACTIVE | Noted: 2023-10-03

## 2025-01-02 PROBLEM — N17.9 ACUTE KIDNEY FAILURE, UNSPECIFIED: Status: ACTIVE | Noted: 2023-08-18

## 2025-01-27 PROBLEM — J44.1 COPD EXACERBATION: Status: ACTIVE | Noted: 2025-01-27

## 2025-01-27 PROBLEM — N18.6 ESRD (END STAGE RENAL DISEASE): Status: RESOLVED | Noted: 2023-07-15 | Resolved: 2025-01-27

## 2025-01-29 PROBLEM — R06.00 DYSPNEA: Status: ACTIVE | Noted: 2024-05-03

## 2025-01-29 PROBLEM — I46.9 CARDIAC ARREST: Status: ACTIVE | Noted: 2025-01-29

## 2025-01-29 PROBLEM — J96.21 ACUTE ON CHRONIC RESPIRATORY FAILURE WITH HYPOXEMIA: Status: ACTIVE | Noted: 2024-11-22

## 2025-01-29 PROBLEM — Z99.11 ON MECHANICALLY ASSISTED VENTILATION: Status: ACTIVE | Noted: 2025-01-29

## 2025-01-29 PROBLEM — D64.9 NORMOCYTIC ANEMIA: Status: ACTIVE | Noted: 2023-08-09

## 2025-05-27 ENCOUNTER — TELEPHONE (OUTPATIENT)
Dept: SMOKING CESSATION | Facility: CLINIC | Age: 65
End: 2025-05-27
Payer: MEDICAID